# Patient Record
Sex: FEMALE | Race: WHITE | NOT HISPANIC OR LATINO | URBAN - METROPOLITAN AREA
[De-identification: names, ages, dates, MRNs, and addresses within clinical notes are randomized per-mention and may not be internally consistent; named-entity substitution may affect disease eponyms.]

---

## 2022-03-30 ENCOUNTER — LAB OUTSIDE AN ENCOUNTER (OUTPATIENT)
Dept: URBAN - METROPOLITAN AREA CLINIC 52 | Facility: CLINIC | Age: 42
End: 2022-03-30

## 2022-03-30 ENCOUNTER — DASHBOARD ENCOUNTERS (OUTPATIENT)
Age: 42
End: 2022-03-30

## 2022-03-30 ENCOUNTER — OFFICE VISIT (OUTPATIENT)
Dept: URBAN - METROPOLITAN AREA CLINIC 52 | Facility: CLINIC | Age: 42
End: 2022-03-30
Payer: SELF-PAY

## 2022-03-30 VITALS
TEMPERATURE: 98.1 F | BODY MASS INDEX: 31.45 KG/M2 | WEIGHT: 184.2 LBS | SYSTOLIC BLOOD PRESSURE: 130 MMHG | HEIGHT: 64 IN | HEART RATE: 78 BPM | DIASTOLIC BLOOD PRESSURE: 78 MMHG

## 2022-03-30 DIAGNOSIS — Z12.11 COLON CANCER SCREENING: ICD-10-CM

## 2022-03-30 DIAGNOSIS — R10.84 ABDOMINAL CRAMPING, GENERALIZED: ICD-10-CM

## 2022-03-30 DIAGNOSIS — Z86.010 HISTORY OF COLON POLYPS: ICD-10-CM

## 2022-03-30 DIAGNOSIS — K21.9 GASTROESOPHAGEAL REFLUX DISEASE, UNSPECIFIED WHETHER ESOPHAGITIS PRESENT: ICD-10-CM

## 2022-03-30 PROCEDURE — 99203 OFFICE O/P NEW LOW 30 MIN: CPT | Performed by: INTERNAL MEDICINE

## 2022-03-30 RX ORDER — DICYCLOMINE HYDROCHLORIDE 20 MG/1
1 TABLET TABLET ORAL TWICE A DAY
Qty: 60 | Refills: 2 | OUTPATIENT
Start: 2022-03-30 | End: 2022-06-28

## 2022-03-30 NOTE — PHYSICAL EXAM GASTROINTESTINAL
Abdomen , soft, nondistended, RLQ and right mid abdominal tenderness on palpation, no guarding or rigidity , no masses palpable , normal bowel sounds , Liver and Spleen , no hepatomegaly present , no hepatosplenomegaly , liver nontender , spleen not palpable

## 2022-03-30 NOTE — HPI-TODAY'S VISIT:
This is a 42 y.o. female with h/o colon polyps and PSH of C-sections x 4 and hysterectomy. She was referred by Treasure Camarillo PA-C for EGD and colonoscopy.  According to patient, she has had right sided abdominal pain for two years, with symptoms progressively worsening over the past three months. Pain is located in the RUQ, right mid abdomen and RLQ. She states it feels like a rock is sitting in the abdominal region. Leaning back or eating makes the pain better. In addition, she feels a burning sensation to the right of the belly button. At times the pain can feel crampy and achy, and can be worse with a bowel movement.  She also reports reflux for several months, and states she was started on Omeprazole 40mg QD last week by her GYN. Reflux has improved since starting the medication. She denies epigastric pain, black or bloody stools, nausea/vomiting, rectal bleeding, diarrhea or constipation.  She does report a weight gain of 12 pounds over three months.  Her last colonoscopy was 8/9/2016 with Dr. Cezar Self with findings of 6-mm polyp in sigmoid colon and melanosis coli. Pathology was consistent with hyperplastic polyp.

## 2022-04-13 ENCOUNTER — TELEPHONE ENCOUNTER (OUTPATIENT)
Dept: URBAN - METROPOLITAN AREA CLINIC 52 | Facility: CLINIC | Age: 42
End: 2022-04-13

## 2022-04-22 PROBLEM — 428283002: Status: ACTIVE | Noted: 2022-03-30

## 2022-04-22 PROBLEM — 235595009: Status: ACTIVE | Noted: 2022-03-30

## 2022-05-02 ENCOUNTER — OFFICE VISIT (OUTPATIENT)
Dept: URBAN - METROPOLITAN AREA SURGERY CENTER 17 | Facility: SURGERY CENTER | Age: 42
End: 2022-05-02
Payer: SELF-PAY

## 2022-05-02 ENCOUNTER — CLAIMS CREATED FROM THE CLAIM WINDOW (OUTPATIENT)
Dept: URBAN - METROPOLITAN AREA CLINIC 4 | Facility: CLINIC | Age: 42
End: 2022-05-02
Payer: SELF-PAY

## 2022-05-02 DIAGNOSIS — Z12.11 COLON CANCER SCREENING: ICD-10-CM

## 2022-05-02 DIAGNOSIS — K21.9 GASTRO-ESOPHAGEAL REFLUX DISEASE WITHOUT ESOPHAGITIS: ICD-10-CM

## 2022-05-02 DIAGNOSIS — R12 BURNING REFLUX: ICD-10-CM

## 2022-05-02 DIAGNOSIS — K21.9 ACID REFLUX: ICD-10-CM

## 2022-05-02 PROCEDURE — PINCL ALL INCLUSIVE: Performed by: INTERNAL MEDICINE

## 2022-05-02 PROCEDURE — 43239 EGD BIOPSY SINGLE/MULTIPLE: CPT | Performed by: INTERNAL MEDICINE

## 2022-05-02 PROCEDURE — G8907 PT DOC NO EVENTS ON DISCHARG: HCPCS | Performed by: INTERNAL MEDICINE

## 2022-05-02 PROCEDURE — 45378 DIAGNOSTIC COLONOSCOPY: CPT | Performed by: INTERNAL MEDICINE

## 2022-05-02 PROCEDURE — 88305 TISSUE EXAM BY PATHOLOGIST: CPT | Performed by: PATHOLOGY

## 2022-05-02 PROCEDURE — 88312 SPECIAL STAINS GROUP 1: CPT | Performed by: PATHOLOGY

## 2022-05-02 RX ORDER — DICYCLOMINE HYDROCHLORIDE 20 MG/1
1 TABLET TABLET ORAL TWICE A DAY
Qty: 60 | Refills: 2 | Status: ACTIVE | COMMUNITY
Start: 2022-03-30 | End: 2022-06-28